# Patient Record
Sex: FEMALE | Race: WHITE | NOT HISPANIC OR LATINO | Employment: OTHER | URBAN - NONMETROPOLITAN AREA
[De-identification: names, ages, dates, MRNs, and addresses within clinical notes are randomized per-mention and may not be internally consistent; named-entity substitution may affect disease eponyms.]

---

## 2020-08-12 PROBLEM — I10 ESSENTIAL HYPERTENSION: Status: ACTIVE | Noted: 2020-08-12

## 2020-08-12 PROBLEM — R00.2 PALPITATIONS: Status: ACTIVE | Noted: 2020-08-12

## 2020-08-12 PROBLEM — E78.5 HYPERLIPIDEMIA: Status: ACTIVE | Noted: 2020-08-12

## 2020-08-12 PROBLEM — R07.9 CHEST PAIN: Status: ACTIVE | Noted: 2020-08-12

## 2020-08-14 ENCOUNTER — CONSULT (OUTPATIENT)
Dept: CARDIOLOGY | Facility: CLINIC | Age: 50
End: 2020-08-14

## 2020-08-14 DIAGNOSIS — I10 ESSENTIAL HYPERTENSION: Primary | ICD-10-CM

## 2020-08-14 DIAGNOSIS — I20.8 ATYPICAL ANGINA (HCC): ICD-10-CM

## 2020-08-14 DIAGNOSIS — R00.2 PALPITATIONS: ICD-10-CM

## 2020-08-14 DIAGNOSIS — R06.09 DYSPNEA ON EXERTION: ICD-10-CM

## 2020-08-14 DIAGNOSIS — E78.5 HYPERLIPIDEMIA, UNSPECIFIED HYPERLIPIDEMIA TYPE: ICD-10-CM

## 2020-08-14 PROCEDURE — 93000 ELECTROCARDIOGRAM COMPLETE: CPT | Performed by: INTERNAL MEDICINE

## 2020-08-14 PROCEDURE — 99243 OFF/OP CNSLTJ NEW/EST LOW 30: CPT | Performed by: INTERNAL MEDICINE

## 2020-08-14 RX ORDER — ISOSORBIDE MONONITRATE 30 MG/1
30 TABLET, EXTENDED RELEASE ORAL DAILY
COMMUNITY
End: 2020-08-14

## 2020-08-14 RX ORDER — HYDROCHLOROTHIAZIDE 12.5 MG/1
12.5 CAPSULE, GELATIN COATED ORAL DAILY
Qty: 90 CAPSULE | Refills: 3
Start: 2020-08-14

## 2020-08-14 RX ORDER — OMEPRAZOLE 20 MG/1
20 CAPSULE, DELAYED RELEASE ORAL DAILY
COMMUNITY

## 2020-08-14 RX ORDER — ATORVASTATIN CALCIUM 10 MG/1
10 TABLET, FILM COATED ORAL DAILY
COMMUNITY
End: 2020-08-14 | Stop reason: SDUPTHER

## 2020-08-14 RX ORDER — METOPROLOL TARTRATE 50 MG/1
50 TABLET, FILM COATED ORAL 2 TIMES DAILY
Qty: 180 TABLET | Refills: 3
Start: 2020-08-14

## 2020-08-14 RX ORDER — LEVOTHYROXINE SODIUM 88 UG/1
88 TABLET ORAL DAILY
COMMUNITY

## 2020-08-14 RX ORDER — POTASSIUM CHLORIDE 750 MG/1
10 TABLET, EXTENDED RELEASE ORAL 2 TIMES DAILY
COMMUNITY

## 2020-08-14 RX ORDER — LORATADINE 10 MG/1
CAPSULE, LIQUID FILLED ORAL
COMMUNITY

## 2020-08-14 RX ORDER — METOPROLOL TARTRATE 100 MG/1
100 TABLET ORAL 2 TIMES DAILY
COMMUNITY
End: 2020-08-14 | Stop reason: SDUPTHER

## 2020-08-14 RX ORDER — ASCORBIC ACID 125 MG
TABLET,CHEWABLE ORAL
COMMUNITY

## 2020-08-14 RX ORDER — HYDROCHLOROTHIAZIDE 12.5 MG/1
12.5 CAPSULE, GELATIN COATED ORAL DAILY
COMMUNITY
End: 2020-08-14 | Stop reason: SDUPTHER

## 2020-08-14 RX ORDER — ASPIRIN 81 MG/1
81 TABLET ORAL DAILY
COMMUNITY
End: 2020-08-14

## 2020-08-14 RX ORDER — ATORVASTATIN CALCIUM 10 MG/1
10 TABLET, FILM COATED ORAL DAILY
Start: 2020-08-14

## 2020-08-14 NOTE — ASSESSMENT & PLAN NOTE
· Patient symptomatology unlikely related to obstructive coronary artery disease  · Nuclear stress testing performed last year consistent with breast attenuation artifact  · Lack of clinical benefit with long-acting nitrate further argues against obstructive coronary artery disease or microvascular angina  · Discontinue isosorbide  · Continue metoprolol primarily for palpitation symptom  · Discontinue low-dose aspirin  · Proceed with endoscopy and weight loss surgery with acceptable cardiac risk

## 2020-08-14 NOTE — ASSESSMENT & PLAN NOTE
· I suspect the patient is not in a statin benefit group based on estimated 10-year cardiovascular risk calculation  · Atorvastatin therapy may be discontinued and reassessed in the future if patient desires

## 2020-08-14 NOTE — PROGRESS NOTES
"[facility]  Cardiology Consultation Note     DATE: 08/14/2020  Requesting Provider: Millie MARTÍNEZ MD  PCP: Millie Restrepo MD    IDENTIFICATION: Milton Nieto is a 49 y.o. female who resides in [place], [place].    REASON FOR CONSULTATION: Chest pain, abnormal stress test, PVCs, cardiac risk factors          Dear Dr. Restrepo,    Thank you for referring Milton Nieto to my office for evaluation of her chest pain, abnormal stress test, palpitations, shortness of breath.  Milton is a very nice 49-year-old female who is struggling with morbid obesity.  She is planning to undergo weight loss surgery to help her condition.  She has a longstanding history of chest discomfort dating back years.  The discomfort is midsternal and almost epigastric in location.  She notices this most when she is sitting and particularly after she is eaten.  This discomfort is not exacerbated by activity.  Last year, the patient underwent nuclear stress testing at [facility] for these symptoms.  The nuclear perfusion images were notable for breast attenuation artifact.  The interpreting physician, Dr. Holland, recommended medical therapy and prescribed her isosorbide mononitrate.  This did not affect her chest pain symptoms.  She was recently told by a nurse practitioner and that office to increase isosorbide but she did not feel comfortable doing so.  The patient did have a heart catheterization about 15 years ago and was told her arteries were \"perfect\".    The patient does have shortness of breath which she believes is related to her weight.  She is able to walk to her mailbox and back but sometimes has to stop.  She is able to walk up a flight of stairs.    The patient has had longstanding palpitation symptoms this was evaluated last year with a mobile cardiac outpatient telemetry monitor which showed only sinus rhythm and rare PACs and PVCs.      The patient complains of generalized fatigue " symptoms and dizziness.    Past Medical History, Past Surgical History, Family history, Social History, and Medications were all reviewed with the patient today and updated as necessary.       Current Outpatient Medications:   •  atorvastatin (LIPITOR) 10 MG tablet, Take 1 tablet by mouth Daily., Disp: , Rfl:   •  Cyanocobalamin (B-12) 5000 MCG capsule, Take  by mouth., Disp: , Rfl:   •  Ergocalciferol (VITAMIN D2 PO), Take  by mouth., Disp: , Rfl:   •  hydroCHLOROthiazide (MICROZIDE) 12.5 MG capsule, Take 1 capsule by mouth Daily., Disp: 90 capsule, Rfl: 3  •  levothyroxine (SYNTHROID, LEVOTHROID) 88 MCG tablet, Take 88 mcg by mouth Daily., Disp: , Rfl:   •  Loratadine 10 MG capsule, Take  by mouth., Disp: , Rfl:   •  metoprolol tartrate (LOPRESSOR) 50 MG tablet, Take 1 tablet by mouth 2 (Two) Times a Day., Disp: 180 tablet, Rfl: 3  •  omeprazole (priLOSEC) 20 MG capsule, Take 20 mg by mouth Daily., Disp: , Rfl:   •  potassium chloride (K-DUR,KLOR-CON) 10 MEQ CR tablet, Take 10 mEq by mouth 2 (Two) Times a Day., Disp: , Rfl:   •  sertraline (ZOLOFT) 50 MG tablet, Take 50 mg by mouth Daily., Disp: , Rfl:     Allergies   Allergen Reactions   • Sulfa Antibiotics Swelling         Past Medical History:   Diagnosis Date   • Deep vein thrombosis (CMS/HCC) 2005   • Hyperlipidemia    • Hypertension    • Palpitations    • PVC's (premature ventricular contractions)    • Sleep apnea     Further Assesment       Past Surgical History:   Procedure Laterality Date   • CARDIAC CATHETERIZATION Left 2005    Left Leg, Groin        Family History   Problem Relation Age of Onset   • Heart failure Mother    • Transient ischemic attack Mother    • Clotting disorder Mother    • Rheum arthritis Sister    • Hypertension Brother        Social History     Tobacco Use   • Smoking status: Never Smoker   • Smokeless tobacco: Never Used   Substance Use Topics   • Alcohol use: Never     Frequency: Never       Review of Systems   Constitution:  "Positive for malaise/fatigue and weight gain.   Eyes: Negative for vision loss in left eye and vision loss in right eye.   Cardiovascular: Positive for chest pain, dyspnea on exertion and palpitations. Negative for near-syncope, orthopnea, paroxysmal nocturnal dyspnea and syncope.   Musculoskeletal: Positive for arthritis and myalgias.   Neurological: Positive for dizziness, headaches and light-headedness. Negative for brief paralysis, excessive daytime sleepiness, focal weakness, numbness, paresthesias and weakness.   All other systems reviewed and are negative.              There were no vitals taken for this visit.       Physical Exam   Constitutional: She is oriented to person, place, and time. She appears well-developed.   Morbidly obese   HENT:   Head: Normocephalic and atraumatic.   Eyes: Conjunctivae are normal. No scleral icterus.   Neck: Normal range of motion. No JVD present. Carotid bruit is not present. No thyromegaly present.   Cardiovascular: Normal rate and regular rhythm. Exam reveals no gallop.   No murmur heard.  Pulmonary/Chest: Effort normal and breath sounds normal.   Abdominal: Soft. She exhibits no distension and no mass. There is no hepatosplenomegaly.   Musculoskeletal: She exhibits no edema.   Neurological: She is alert and oriented to person, place, and time.   Skin: Skin is warm and dry. No rash noted.   Psychiatric: She has a normal mood and affect. Her behavior is normal.           ECG 12 Lead  Date/Time: 8/14/2020 1:00 PM  Performed by: Rc Fung IV, MD  Authorized by: Rc Fung IV, MD   Comparison: not compared with previous ECG   Previous ECG: no previous ECG available  Rhythm: sinus rhythm  BPM: 63    Clinical impression: normal ECG          Report of pharmacologic nuclear stress test performed at [facility] on 4/24/2019 was reviewed.  \"Baseline SPECT images demonstrate mild decreased uptake in anterior wall.  Otherwise homogeneous tracer " "uptake throughout the rest of the myocardium.  Stress SPECT images also demonstrate a mild decreased uptake of Myoview over the anterior wall which appears to be a little bit better than compared to rest studies.  This is consistent with breast attenuation.\"    Lab date: 7/21/2020  • FLP: , , HDL 44, LDL 64  • CMP: Glu 111, BUN 10, Creat 0.58, eGFR >60, Na 142, K 4.3, Cl 104, CO2 28, Ca 9.4, Alk Phos 93, AST 20, ALT 24  • CBC: WBC 5.0, RBC 4.58, HGB 13.2, HCT 40.0, MCV 87, MCH 28.8,   • HbA1c: 5.5  • TSH: 1.660         Problem List Items Addressed This Visit        Cardiology Problems    Atypical angina (CMS/HCC)    Overview     · Longstanding chest discomfort primarily noted after eating or when sitting  · Cardiac catheterization circa 2005 reportedly normal  · Nuclear stress test (04/23/2019): Paradoxical anterior wall defect most likely related to breast attenuation.  Normal LVEF         Current Assessment & Plan     · Patient symptomatology unlikely related to obstructive coronary artery disease  · Nuclear stress testing performed last year consistent with breast attenuation artifact  · Lack of clinical benefit with long-acting nitrate further argues against obstructive coronary artery disease or microvascular angina  · Discontinue isosorbide  · Continue metoprolol primarily for palpitation symptom  · Discontinue low-dose aspirin  · Proceed with endoscopy and weight loss surgery with acceptable cardiac risk         Relevant Medications    metoprolol tartrate (LOPRESSOR) 50 MG tablet    Essential hypertension - Primary    Overview     • Target blood pressure <130/80 mmHg  • Echo (4/23/2019): LVEF 60%.  No significant valve abnormality         Current Assessment & Plan     · Controlled  · Continue present medical therapy with HCTZ and metoprolol         Relevant Medications    metoprolol tartrate (LOPRESSOR) 50 MG tablet    hydroCHLOROthiazide (MICROZIDE) 12.5 MG capsule    Hyperlipidemia    " Current Assessment & Plan     · I suspect the patient is not in a statin benefit group based on estimated 10-year cardiovascular risk calculation  · Atorvastatin therapy may be discontinued and reassessed in the future if patient desires         Relevant Medications    atorvastatin (LIPITOR) 10 MG tablet    Palpitations    Overview     · Echo (04/23/2019): LVEF 60%.  No significant valve abnormality  · MCOT (05/24/2019): SR average HR 65. Rare PVCs and PACs.         Current Assessment & Plan     · Still symptomatic, but relatively well controlled with metoprolol         Relevant Medications    metoprolol tartrate (LOPRESSOR) 50 MG tablet       Other    Dyspnea on exertion    Overview     · Echo (04/23/2019): LVEF 60%.  No significant valve abnormality         Current Assessment & Plan     Suspect dyspnea is related to obesity             49-year-old female with longstanding chest pain symptoms which I suspect are GI related.  Her stress test 1 year ago was acceptable and she has a remote history of normal coronary arteries on a heart catheterization.  I recommend she proceed with work-up for sleeve surgery as well as sleeve surgery with acceptable cardiac risk.  At present, she has no indication for aspirin for primary prevention and questionable indication for statin therapy.  Also, I think she can stop isosorbide as this may be causing some of her dizziness and has not improved her likely noncardiac chest pain symptoms.           · Discontinue isosorbide  · Discontinue low-dose aspirin  · Consider discontinuation of atorvastatin  · Proceed with endoscopy and weight loss surgery with acceptable cardiovascular risk  Return if symptoms worsen or fail to improve.          TRENA Fung MD [facility], [facility]  Interventional and General Cardiology    08/14/20  13:10

## 2021-02-18 ENCOUNTER — TELEPHONE (OUTPATIENT)
Dept: CARDIOLOGY | Facility: CLINIC | Age: 51
End: 2021-02-18

## 2021-02-18 NOTE — TELEPHONE ENCOUNTER
Requesting Risk assessment letter for her Wt loss surgery be faxed to [phone]. Ok to use acceptable CV  Risk as per your consult note from 8/14/2020.   Please advise.